# Patient Record
Sex: MALE | Race: WHITE | NOT HISPANIC OR LATINO | Employment: OTHER | URBAN - METROPOLITAN AREA
[De-identification: names, ages, dates, MRNs, and addresses within clinical notes are randomized per-mention and may not be internally consistent; named-entity substitution may affect disease eponyms.]

---

## 2017-01-30 DIAGNOSIS — I10 ESSENTIAL HYPERTENSION: ICD-10-CM

## 2017-01-30 RX ORDER — ALPRAZOLAM 0.25 MG/1
TABLET ORAL
Qty: 90 TABLET | Refills: 0 | OUTPATIENT
Start: 2017-01-30

## 2017-01-30 RX ORDER — BENAZEPRIL/HYDROCHLOROTHIAZIDE 20 MG-25MG
TABLET ORAL
Qty: 90 TABLET | Refills: 0 | OUTPATIENT
Start: 2017-01-30

## 2017-01-30 NOTE — TELEPHONE ENCOUNTER
The patient needs an appointment for a face to face visit for documentation of chronic narcotic use.

## 2017-02-06 ENCOUNTER — OFFICE VISIT (OUTPATIENT)
Dept: FAMILY MEDICINE | Facility: CLINIC | Age: 77
End: 2017-02-06
Payer: MEDICARE

## 2017-02-06 VITALS
TEMPERATURE: 98 F | OXYGEN SATURATION: 98 % | HEIGHT: 68 IN | BODY MASS INDEX: 22.38 KG/M2 | WEIGHT: 147.69 LBS | HEART RATE: 69 BPM

## 2017-02-06 DIAGNOSIS — R93.7 ABNORMAL MRI, CERVICAL SPINE: ICD-10-CM

## 2017-02-06 DIAGNOSIS — L98.9 LESION OF NECK: ICD-10-CM

## 2017-02-06 DIAGNOSIS — I10 ESSENTIAL HYPERTENSION: Primary | ICD-10-CM

## 2017-02-06 DIAGNOSIS — Z86.010 HISTORY OF COLON POLYPS: ICD-10-CM

## 2017-02-06 DIAGNOSIS — Z23 IMMUNIZATION DUE: ICD-10-CM

## 2017-02-06 DIAGNOSIS — F17.210 CIGARETTE NICOTINE DEPENDENCE WITHOUT COMPLICATION: ICD-10-CM

## 2017-02-06 DIAGNOSIS — F41.9 ANXIETY: ICD-10-CM

## 2017-02-06 PROCEDURE — 90670 PCV13 VACCINE IM: CPT | Mod: PBBFAC,PO | Performed by: FAMILY MEDICINE

## 2017-02-06 PROCEDURE — 99213 OFFICE O/P EST LOW 20 MIN: CPT | Mod: PBBFAC,PO | Performed by: FAMILY MEDICINE

## 2017-02-06 PROCEDURE — 99999 PR PBB SHADOW E&M-EST. PATIENT-LVL III: CPT | Mod: PBBFAC,,, | Performed by: FAMILY MEDICINE

## 2017-02-06 PROCEDURE — 99214 OFFICE O/P EST MOD 30 MIN: CPT | Mod: S$PBB,,, | Performed by: FAMILY MEDICINE

## 2017-02-06 RX ORDER — CITALOPRAM 20 MG/1
20 TABLET, FILM COATED ORAL DAILY
Qty: 90 TABLET | Refills: 3 | Status: SHIPPED | OUTPATIENT
Start: 2017-02-06 | End: 2018-02-06

## 2017-02-06 RX ORDER — BENAZEPRIL/HYDROCHLOROTHIAZIDE 20 MG-25MG
1 TABLET ORAL DAILY
Qty: 90 TABLET | Refills: 3 | Status: SHIPPED | OUTPATIENT
Start: 2017-02-06 | End: 2018-02-06

## 2017-02-06 RX ORDER — ALPRAZOLAM 0.25 MG/1
0.25 TABLET ORAL 3 TIMES DAILY
Qty: 90 TABLET | Refills: 5 | Status: SHIPPED | OUTPATIENT
Start: 2017-02-06

## 2017-02-06 NOTE — PROGRESS NOTES
"Subjective:      Patient ID: Howard Roth Sr. is a 76 y.o. male.    Chief Complaint: Medication Refill    HPI he lives alone and he states that he has had problems with his daughter who is a "pill head" and he has had anxiety due to this.  He states that this has caused him financial struggles.  He states that this is stable at this point but he is needing to get his xanax filled.  Sometimes he can get by with one but most often he has to take 3 a day.      The patient presents with essential hypertension.  The patient is tolerating the medication well and is in excellent compliance.  The patient is experiencing no side effects.  Counseling was offered regarding low salt diets.  The patient has a reduced salt intake.  The patient denies chest pain, palpitations, shortness of breath, dyspnea on exertion, left or murmur neck pain, nausea, vomiting, diaphoresis, paroxysmal nocturnal dyspnea, and orthopnea.     Visit Vitals    Pulse 69    Temp 98.3 °F (36.8 °C) (Oral)    Ht 5' 8" (1.727 m)    Wt 67 kg (147 lb 11.3 oz)    SpO2 98%    BMI 22.46 kg/m2     Health Maintenance Due   Topic Date Due    TETANUS VACCINE  07/27/1958    Zoster Vaccine  07/27/2001    Pneumococcal (65+) (1 of 2 - PCV13) 07/27/2006    Colonoscopy  10/05/2013    Influenza Vaccine  08/01/2016     He is a smoker and he has been doing this for most of his life.  I discussed smoking cessation and he is willing to try.     he has had colon polyps in 2010 and he was due in 2013.  He has no bleeding or abdominal pain.     He was seen in January of last year by Dr. Gomez for an abnormality and he had the following impression and plan.  Imaging:  MRI of T-spine 2/5/2016 shows a large cyst in upper-middle of spinal cord at C1 with some contrast. I have personally reviewed the images with the pt.          I, Dr. Henry Gomez, personally performed the services described in this documentation as scribed by Ivelisse Buchanan in my presence, and it is both " accurate and complete.     Assessment:       1. Syrinx of spinal cord    2. Cervical spondylosis        Plan:   I do not recommend surgery at this time. I will schedule the pt a 6 month FU with MRI of T-spine. If the pt experiences any new symptoms/ complications the pt should contact us.      (in review of his mri and the notes above on the imaging and not of the plan, this is really a cervical lesion and not a Tspine lesion as noted in the plan.  I believe that this is a typo)      Past Medical History:  Past Medical History   Diagnosis Date    Anxiety     Hypertension      Past Surgical History   Procedure Laterality Date    Tonsillectomy       Review of patient's allergies indicates:  No Known Allergies  Current Outpatient Prescriptions on File Prior to Visit   Medication Sig Dispense Refill    albuterol 90 mcg/actuation inhaler Inhale 2 puffs into the lungs every 6 (six) hours as needed for Wheezing.      alprazolam (XANAX) 0.25 MG tablet TAKE ONE TABLET BY MOUTH THREE TIMES DAILY AS NEEDED FOR ANXIETY 90 tablet 0    fluticasone (FLONASE) 50 mcg/actuation nasal spray 1 spray by Each Nare route once daily.      vardenafil (LEVITRA) 10 MG tablet Take 1 tablet (10 mg total) by mouth daily as needed. 10 tablet 2    aspirin (ECOTRIN) 81 MG EC tablet Take 1 tablet (81 mg total) by mouth once daily.  0    benazepril-hydrochlorthiazide (LOTENSIN HCT) 20-25 mg Tab Take 1 tablet by mouth once daily. 90 tablet 3     No current facility-administered medications on file prior to visit.      Social History     Social History    Marital status: Single     Spouse name: N/A    Number of children: N/A    Years of education: N/A     Occupational History    Not on file.     Social History Main Topics    Smoking status: Current Every Day Smoker     Packs/day: 0.50     Types: Cigarettes    Smokeless tobacco: Not on file    Alcohol use Yes      Comment: on occassions    Drug use: No    Sexual activity: Not Currently  "    Partners: Female     Other Topics Concern    Not on file     Social History Narrative    Living alone in [place]. He is employed in  at an electric company.      Family History   Problem Relation Age of Onset    Cancer Father      stomach    Heart disease Neg Hx            Review of Systems   Constitutional: Negative for fatigue.   Respiratory: Negative for cough, shortness of breath and wheezing.    Cardiovascular: Negative for chest pain and palpitations.   Gastrointestinal: Negative for abdominal distention and abdominal pain.   Genitourinary: Negative for dysuria and hematuria.   Psychiatric/Behavioral: Positive for agitation. Negative for suicidal ideas. The patient is nervous/anxious.        Objective:     Visit Vitals    Pulse 69    Temp 98.3 °F (36.8 °C) (Oral)    Ht 5' 8" (1.727 m)    Wt 67 kg (147 lb 11.3 oz)    SpO2 98%    BMI 22.46 kg/m2       Physical Exam   Constitutional: He is oriented to person, place, and time. He appears well-developed and well-nourished.   HENT:   Head: Normocephalic and atraumatic.   Right Ear: External ear normal.   Left Ear: External ear normal.   Nose: Nose normal.   Mouth/Throat: Oropharynx is clear and moist. No oropharyngeal exudate.   Eyes: Conjunctivae and EOM are normal. Pupils are equal, round, and reactive to light. Right eye exhibits no discharge. Left eye exhibits no discharge. No scleral icterus.   Neck: Normal range of motion. Neck supple. No JVD present. No thyromegaly present.   Cardiovascular: Normal rate, regular rhythm, normal heart sounds and intact distal pulses.  Exam reveals no gallop and no friction rub.    No murmur heard.  Pulmonary/Chest: Effort normal and breath sounds normal. No respiratory distress. He has no wheezes. He has no rales. He exhibits no tenderness.   Abdominal: Soft. Bowel sounds are normal. He exhibits no distension and no mass. There is no tenderness. There is no rebound and no guarding. "   Musculoskeletal: Normal range of motion. He exhibits no edema or tenderness.   Lymphadenopathy:     He has no cervical adenopathy.   Neurological: He is alert and oriented to person, place, and time. No cranial nerve deficit. Coordination normal.   Skin: Skin is warm and dry. He is not diaphoretic.   Psychiatric: He has a normal mood and affect.       Assessment:     1. Essential hypertension    2. Anxiety    3. Cigarette nicotine dependence without complication    4. Immunization due    5. History of colon polyps    6. Cervical lesion    7. Abnormal MRI, cervical spine        Plan:   Howard was seen today for medication refill.    Diagnoses and all orders for this visit:    Essential hypertension  -     benazepril-hydrochlorthiazide (LOTENSIN HCT) 20-25 mg Tab; Take 1 tablet by mouth once daily.  -     Comprehensive metabolic panel; Future  -     Lipid panel; Future    Anxiety  -     citalopram (CELEXA) 20 MG tablet; Take 1 tablet (20 mg total) by mouth once daily.  -     alprazolam (XANAX) 0.25 MG tablet; Take 1 tablet (0.25 mg total) by mouth 3 (three) times daily.    Cigarette nicotine dependence without complication  -     Ambulatory referral to Smoking Cessation Program    Immunization due  -     Pneumococcal Conjugate Vaccine (13 Valent) (IM)    History of colon polyps  -     Case request GI: COLONOSCOPY    Cervical lesion  -     MRI Cervical Spine With Contrast; Future  -     Ambulatory referral to Neurosurgery    Abnormal MRI, cervical spine  -     MRI Cervical Spine With Contrast; Future  -     Ambulatory referral to Neurosurgery

## 2017-02-08 ENCOUNTER — LAB VISIT (OUTPATIENT)
Dept: LAB | Facility: HOSPITAL | Age: 77
End: 2017-02-08
Attending: FAMILY MEDICINE
Payer: MEDICARE

## 2017-02-08 DIAGNOSIS — I10 ESSENTIAL HYPERTENSION: ICD-10-CM

## 2017-02-08 LAB
ALBUMIN SERPL BCP-MCNC: 3.4 G/DL
ALP SERPL-CCNC: 56 U/L
ALT SERPL W/O P-5'-P-CCNC: 14 U/L
ANION GAP SERPL CALC-SCNC: 7 MMOL/L
AST SERPL-CCNC: 27 U/L
BILIRUB SERPL-MCNC: 0.6 MG/DL
BUN SERPL-MCNC: 10 MG/DL
CALCIUM SERPL-MCNC: 8.7 MG/DL
CHLORIDE SERPL-SCNC: 101 MMOL/L
CHOLEST/HDLC SERPL: 2.1 {RATIO}
CO2 SERPL-SCNC: 29 MMOL/L
CREAT SERPL-MCNC: 0.9 MG/DL
EST. GFR  (AFRICAN AMERICAN): >60 ML/MIN/1.73 M^2
EST. GFR  (NON AFRICAN AMERICAN): >60 ML/MIN/1.73 M^2
GLUCOSE SERPL-MCNC: 71 MG/DL
HDL/CHOLESTEROL RATIO: 48.2 %
HDLC SERPL-MCNC: 141 MG/DL
HDLC SERPL-MCNC: 68 MG/DL
LDLC SERPL CALC-MCNC: 60.6 MG/DL
NONHDLC SERPL-MCNC: 73 MG/DL
POTASSIUM SERPL-SCNC: 4 MMOL/L
PROT SERPL-MCNC: 7.1 G/DL
SODIUM SERPL-SCNC: 137 MMOL/L
TRIGL SERPL-MCNC: 62 MG/DL

## 2017-02-08 PROCEDURE — 36415 COLL VENOUS BLD VENIPUNCTURE: CPT | Mod: PO

## 2017-02-08 PROCEDURE — 80061 LIPID PANEL: CPT

## 2017-02-08 PROCEDURE — 80053 COMPREHEN METABOLIC PANEL: CPT

## 2017-09-04 DIAGNOSIS — F41.9 ANXIETY: ICD-10-CM

## 2017-09-04 RX ORDER — ALPRAZOLAM 0.25 MG/1
TABLET ORAL
Qty: 90 TABLET | Refills: 5 | OUTPATIENT
Start: 2017-09-04

## 2017-09-04 NOTE — TELEPHONE ENCOUNTER
The patient needs an appointment for a face to face visit for documentation of chronic narcotic use.  I cannot refill the requested narcotic at this time.

## 2017-09-05 ENCOUNTER — TELEPHONE (OUTPATIENT)
Dept: FAMILY MEDICINE | Facility: CLINIC | Age: 77
End: 2017-09-05